# Patient Record
Sex: MALE | URBAN - METROPOLITAN AREA
[De-identification: names, ages, dates, MRNs, and addresses within clinical notes are randomized per-mention and may not be internally consistent; named-entity substitution may affect disease eponyms.]

---

## 2021-02-16 ENCOUNTER — NEW PATIENT (OUTPATIENT)
Dept: URBAN - METROPOLITAN AREA CLINIC 79 | Facility: CLINIC | Age: 56
End: 2021-02-16

## 2021-02-16 DIAGNOSIS — H26.102: ICD-10-CM

## 2021-02-16 DIAGNOSIS — H40.013: ICD-10-CM

## 2021-02-16 DIAGNOSIS — H35.3111: ICD-10-CM

## 2021-02-16 DIAGNOSIS — H52.203: ICD-10-CM

## 2021-02-16 PROCEDURE — 99204 OFFICE O/P NEW MOD 45 MIN: CPT

## 2021-02-16 PROCEDURE — 92133 CPTRZD OPH DX IMG PST SGM ON: CPT

## 2021-02-16 PROCEDURE — 92134 CPTRZ OPH DX IMG PST SGM RTA: CPT

## 2021-02-16 PROCEDURE — 92015 DETERMINE REFRACTIVE STATE: CPT

## 2021-02-16 ASSESSMENT — TONOMETRY
OD_IOP_MMHG: 22
OS_IOP_MMHG: 23

## 2021-02-16 ASSESSMENT — VISUAL ACUITY
OD_SC: 20/20
OS_SC: J10-1
OD_SC: J16
OD_CC: 20/20-1
OS_SC: 20/200
OS_CC: 20/30

## 2021-04-01 ENCOUNTER — CONSULT EP (OUTPATIENT)
Dept: URBAN - METROPOLITAN AREA CLINIC 79 | Facility: CLINIC | Age: 56
End: 2021-04-01

## 2021-04-01 VITALS — HEIGHT: 60 IN

## 2021-04-01 DIAGNOSIS — H40.023: ICD-10-CM

## 2021-04-01 PROCEDURE — 92250 FUNDUS PHOTOGRAPHY W/I&R: CPT

## 2021-04-01 PROCEDURE — 99214 OFFICE O/P EST MOD 30 MIN: CPT

## 2021-04-01 PROCEDURE — 92020 GONIOSCOPY: CPT

## 2021-04-01 PROCEDURE — 76514 ECHO EXAM OF EYE THICKNESS: CPT

## 2021-04-01 ASSESSMENT — PACHYMETRY
OS_CT_UM: 572
OD_CT_UM: 580

## 2021-04-01 ASSESSMENT — VISUAL ACUITY
OS_CC: 20/30
OD_CC: 20/20

## 2021-04-01 ASSESSMENT — TONOMETRY
OS_IOP_MMHG: 28
OD_IOP_MMHG: 24

## 2021-04-13 ENCOUNTER — CONSULT EP (OUTPATIENT)
Dept: URBAN - METROPOLITAN AREA CLINIC 79 | Facility: CLINIC | Age: 56
End: 2021-04-13

## 2021-04-13 DIAGNOSIS — H35.3132: ICD-10-CM

## 2021-04-13 PROCEDURE — 92134 CPTRZ OPH DX IMG PST SGM RTA: CPT

## 2021-04-13 PROCEDURE — 92014 COMPRE OPH EXAM EST PT 1/>: CPT

## 2021-04-13 ASSESSMENT — VISUAL ACUITY
OS_CC: 20/40+2
OU_CC: 20/25
OD_CC: 20/20-1

## 2021-04-13 ASSESSMENT — TONOMETRY
OS_IOP_MMHG: 23
OD_IOP_MMHG: 23

## 2021-04-15 ENCOUNTER — FOLLOW UP (OUTPATIENT)
Dept: URBAN - METROPOLITAN AREA CLINIC 79 | Facility: CLINIC | Age: 56
End: 2021-04-15

## 2021-04-15 DIAGNOSIS — H26.102: ICD-10-CM

## 2021-04-15 PROCEDURE — 92025 CPTRIZED CORNEAL TOPOGRAPHY: CPT

## 2021-04-15 PROCEDURE — 92136 OPHTHALMIC BIOMETRY: CPT

## 2021-04-15 PROCEDURE — 92012 INTRM OPH EXAM EST PATIENT: CPT

## 2021-04-15 ASSESSMENT — TONOMETRY
OD_IOP_MMHG: 25
OS_IOP_MMHG: 24

## 2021-04-15 ASSESSMENT — VISUAL ACUITY
OD_SC: 20/20-1
OS_SC: 20/100
OS_PH: 20/50

## 2021-04-21 ENCOUNTER — SURGERY/PROCEDURE (OUTPATIENT)
Dept: URBAN - METROPOLITAN AREA SURGICAL CENTER 17 | Facility: SURGICAL CENTER | Age: 56
End: 2021-04-21

## 2021-04-21 DIAGNOSIS — H26.102: ICD-10-CM

## 2021-04-21 PROCEDURE — 66984 XCAPSL CTRC RMVL W/O ECP: CPT

## 2021-04-22 ENCOUNTER — 1 DAY POST-OP (OUTPATIENT)
Dept: URBAN - METROPOLITAN AREA CLINIC 79 | Facility: CLINIC | Age: 56
End: 2021-04-22

## 2021-04-22 VITALS — HEIGHT: 60 IN

## 2021-04-22 DIAGNOSIS — Z96.1: ICD-10-CM

## 2021-04-22 PROCEDURE — 99024 POSTOP FOLLOW-UP VISIT: CPT

## 2021-04-22 ASSESSMENT — TONOMETRY
OS_IOP_MMHG: 37
OS_IOP_MMHG: 27

## 2021-04-22 ASSESSMENT — VISUAL ACUITY: OS_SC: 20/30-1

## 2021-05-04 ENCOUNTER — 1 WEEK POST-OP (OUTPATIENT)
Dept: URBAN - METROPOLITAN AREA CLINIC 79 | Facility: CLINIC | Age: 56
End: 2021-05-04

## 2021-05-04 VITALS — HEIGHT: 60 IN

## 2021-05-04 DIAGNOSIS — Z96.1: ICD-10-CM

## 2021-05-04 PROCEDURE — 99024 POSTOP FOLLOW-UP VISIT: CPT

## 2021-05-04 ASSESSMENT — VISUAL ACUITY
OS_SC: 20/25+2
OD_SC: 20/25+3
OD_SC: 20/60-2
OS_SC: 20/200

## 2021-05-04 ASSESSMENT — TONOMETRY
OD_IOP_MMHG: 22
OS_IOP_MMHG: 19

## 2021-05-18 ENCOUNTER — ESTABLISHED COMPREHENSIVE EXAM (OUTPATIENT)
Dept: URBAN - METROPOLITAN AREA CLINIC 79 | Facility: CLINIC | Age: 56
End: 2021-05-18

## 2021-05-18 VITALS — HEIGHT: 60 IN

## 2021-05-18 DIAGNOSIS — Z96.1: ICD-10-CM

## 2021-05-18 DIAGNOSIS — H52.11: ICD-10-CM

## 2021-05-18 PROCEDURE — 99024 POSTOP FOLLOW-UP VISIT: CPT

## 2021-05-18 PROCEDURE — 92015 DETERMINE REFRACTIVE STATE: CPT

## 2021-05-18 ASSESSMENT — VISUAL ACUITY
OU_SC: 20/20
OS_SC: J16
OS_SC: 20/20
OD_SC: J10
OD_SC: 20/20-1

## 2021-05-18 ASSESSMENT — TONOMETRY
OS_IOP_MMHG: 12
OD_IOP_MMHG: 14

## 2021-11-16 ENCOUNTER — FOLLOW UP (OUTPATIENT)
Dept: URBAN - METROPOLITAN AREA CLINIC 79 | Facility: CLINIC | Age: 56
End: 2021-11-16

## 2021-11-16 DIAGNOSIS — H40.013: ICD-10-CM

## 2021-11-16 DIAGNOSIS — H35.3131: ICD-10-CM

## 2021-11-16 DIAGNOSIS — Z96.1: ICD-10-CM

## 2021-11-16 PROCEDURE — 99214 OFFICE O/P EST MOD 30 MIN: CPT

## 2021-11-16 PROCEDURE — 92133 CPTRZD OPH DX IMG PST SGM ON: CPT

## 2021-11-16 PROCEDURE — 92134 CPTRZ OPH DX IMG PST SGM RTA: CPT | Mod: NC

## 2021-11-16 ASSESSMENT — VISUAL ACUITY
OS_SC: 20/20
OD_SC: 20/20

## 2021-11-16 ASSESSMENT — TONOMETRY
OS_IOP_MMHG: 15
OD_IOP_MMHG: 25

## 2021-11-18 ENCOUNTER — IOP CHECK (OUTPATIENT)
Dept: URBAN - METROPOLITAN AREA CLINIC 79 | Facility: CLINIC | Age: 56
End: 2021-11-18

## 2021-11-18 DIAGNOSIS — H40.023: ICD-10-CM

## 2021-11-18 PROCEDURE — 99213 OFFICE O/P EST LOW 20 MIN: CPT

## 2021-11-18 PROCEDURE — 92083 EXTENDED VISUAL FIELD XM: CPT

## 2021-11-18 ASSESSMENT — TONOMETRY
OD_IOP_MMHG: 20
OS_IOP_MMHG: 15
OS_IOP_MMHG: 13
OD_IOP_MMHG: 21

## 2021-11-18 ASSESSMENT — VISUAL ACUITY
OS_CC: 20/20
OD_CC: 20/25

## 2022-01-11 ENCOUNTER — FOLLOW UP (OUTPATIENT)
Dept: URBAN - METROPOLITAN AREA CLINIC 79 | Facility: CLINIC | Age: 57
End: 2022-01-11

## 2022-01-11 DIAGNOSIS — H35.3132: ICD-10-CM

## 2022-01-11 PROCEDURE — 99214 OFFICE O/P EST MOD 30 MIN: CPT

## 2022-01-11 PROCEDURE — 92134 CPTRZ OPH DX IMG PST SGM RTA: CPT

## 2022-01-11 ASSESSMENT — VISUAL ACUITY
OD_SC: 20/20
OS_SC: 20/20

## 2022-01-11 ASSESSMENT — TONOMETRY
OS_IOP_MMHG: 19
OD_IOP_MMHG: 20

## 2022-06-17 ENCOUNTER — ANESTHESIA (OUTPATIENT)
Dept: OPERATING ROOM | Facility: HOSPITAL | Age: 57
Setting detail: OBSERVATION
End: 2022-06-17
Payer: COMMERCIAL

## 2022-06-17 ENCOUNTER — HOSPITAL ENCOUNTER (OUTPATIENT)
Facility: HOSPITAL | Age: 57
Setting detail: OBSERVATION
Discharge: NURSING, REHAB OR LONG-TERM CARE | End: 2022-06-18
Attending: EMERGENCY MEDICINE | Admitting: SURGERY
Payer: COMMERCIAL

## 2022-06-17 ENCOUNTER — APPOINTMENT (EMERGENCY)
Dept: RADIOLOGY | Facility: HOSPITAL | Age: 57
End: 2022-06-17
Attending: EMERGENCY MEDICINE
Payer: COMMERCIAL

## 2022-06-17 DIAGNOSIS — K35.80 ACUTE APPENDICITIS, UNSPECIFIED ACUTE APPENDICITIS TYPE: Primary | ICD-10-CM

## 2022-06-17 LAB
ALBUMIN SERPL-MCNC: 4.4 G/DL (ref 3.4–5)
ALP SERPL-CCNC: 45 IU/L (ref 35–126)
ALT SERPL-CCNC: 22 IU/L (ref 16–63)
ANION GAP SERPL CALC-SCNC: 9 MEQ/L (ref 3–15)
AST SERPL-CCNC: 16 IU/L (ref 15–41)
BACTERIA URNS QL MICRO: ABNORMAL /HPF
BASOPHILS # BLD: 0.02 K/UL (ref 0.01–0.1)
BASOPHILS NFR BLD: 0.1 %
BILIRUB SERPL-MCNC: 1 MG/DL (ref 0.3–1.2)
BILIRUB UR QL STRIP.AUTO: NEGATIVE MG/DL
BUN SERPL-MCNC: 20 MG/DL (ref 8–20)
CALCIUM SERPL-MCNC: 9.5 MG/DL (ref 8.9–10.3)
CHLORIDE SERPL-SCNC: 101 MEQ/L (ref 98–109)
CLARITY UR REFRACT.AUTO: CLEAR
CO2 SERPL-SCNC: 24 MEQ/L (ref 22–32)
COLOR UR AUTO: YELLOW
CREAT SERPL-MCNC: 0.8 MG/DL (ref 0.8–1.3)
DIFFERENTIAL METHOD BLD: ABNORMAL
EOSINOPHIL # BLD: 0.01 K/UL (ref 0.04–0.54)
EOSINOPHIL NFR BLD: 0.1 %
ERYTHROCYTE [DISTWIDTH] IN BLOOD BY AUTOMATED COUNT: 13.2 % (ref 11.6–14.4)
ETHANOL SERPL-MCNC: <5 MG/DL
GFR SERPL CREATININE-BSD FRML MDRD: >60 ML/MIN/1.73M*2
GLUCOSE SERPL-MCNC: 146 MG/DL (ref 70–99)
GLUCOSE UR STRIP.AUTO-MCNC: NEGATIVE MG/DL
HCT VFR BLDCO AUTO: 44.3 % (ref 40.1–51)
HGB BLD-MCNC: 15.6 G/DL (ref 13.7–17.5)
HGB UR QL STRIP.AUTO: NEGATIVE
HYALINE CASTS #/AREA URNS LPF: ABNORMAL /LPF
IMM GRANULOCYTES # BLD AUTO: 0.09 K/UL (ref 0–0.08)
IMM GRANULOCYTES NFR BLD AUTO: 0.6 %
KETONES UR STRIP.AUTO-MCNC: NEGATIVE MG/DL
LEUKOCYTE ESTERASE UR QL STRIP.AUTO: NEGATIVE
LIPASE SERPL-CCNC: 31 U/L (ref 20–51)
LYMPHOCYTES # BLD: 0.68 K/UL (ref 1.2–3.5)
LYMPHOCYTES NFR BLD: 4.8 %
MCH RBC QN AUTO: 31.8 PG (ref 28–33.2)
MCHC RBC AUTO-ENTMCNC: 35.2 G/DL (ref 32.2–36.5)
MCV RBC AUTO: 90.4 FL (ref 83–98)
MONOCYTES # BLD: 1.18 K/UL (ref 0.3–1)
MONOCYTES NFR BLD: 8.3 %
MUCOUS THREADS URNS QL MICRO: 1 /LPF
NEUTROPHILS # BLD: 12.3 K/UL (ref 1.7–7)
NEUTS SEG NFR BLD: 86.1 %
NITRITE UR QL STRIP.AUTO: NEGATIVE
NRBC BLD-RTO: 0 %
PDW BLD AUTO: 11.9 FL (ref 9.4–12.4)
PH UR STRIP.AUTO: 7 [PH]
PLATELET # BLD AUTO: 194 K/UL (ref 150–350)
POTASSIUM SERPL-SCNC: 3.7 MEQ/L (ref 3.6–5.1)
PROT SERPL-MCNC: 7.6 G/DL (ref 6–8.2)
PROT UR QL STRIP.AUTO: 2
RBC # BLD AUTO: 4.9 M/UL (ref 4.5–5.8)
RBC #/AREA URNS HPF: ABNORMAL /HPF
SARS-COV-2 RNA RESP QL NAA+PROBE: NEGATIVE
SODIUM SERPL-SCNC: 134 MEQ/L (ref 136–144)
SP GR UR REFRACT.AUTO: >1.035
SQUAMOUS URNS QL MICRO: ABNORMAL /HPF
TROPONIN I SERPL HS-MCNC: 3.1 PG/ML
UROBILINOGEN UR STRIP-ACNC: 0.2 EU/DL
WBC # BLD AUTO: 14.28 K/UL (ref 3.8–10.5)
WBC #/AREA URNS HPF: ABNORMAL /HPF

## 2022-06-17 PROCEDURE — 36000014 HC OR LEVEL 4 EA ADDL MIN: Performed by: SURGERY

## 2022-06-17 PROCEDURE — 0DTJ4ZZ RESECTION OF APPENDIX, PERCUTANEOUS ENDOSCOPIC APPROACH: ICD-10-PCS | Performed by: SURGERY

## 2022-06-17 PROCEDURE — 80053 COMPREHEN METABOLIC PANEL: CPT | Performed by: NURSE PRACTITIONER

## 2022-06-17 PROCEDURE — 63600000 HC DRUGS/DETAIL CODE: Performed by: NURSE PRACTITIONER

## 2022-06-17 PROCEDURE — 96375 TX/PRO/DX INJ NEW DRUG ADDON: CPT | Mod: 59

## 2022-06-17 PROCEDURE — 25000000 HC PHARMACY GENERAL: Performed by: NURSE PRACTITIONER

## 2022-06-17 PROCEDURE — 63600000 HC DRUGS/DETAIL CODE: Performed by: NURSE ANESTHETIST, CERTIFIED REGISTERED

## 2022-06-17 PROCEDURE — 25000000 HC PHARMACY GENERAL: Performed by: SURGERY

## 2022-06-17 PROCEDURE — 83690 ASSAY OF LIPASE: CPT | Performed by: NURSE PRACTITIONER

## 2022-06-17 PROCEDURE — G0480 DRUG TEST DEF 1-7 CLASSES: HCPCS | Performed by: NURSE PRACTITIONER

## 2022-06-17 PROCEDURE — 36415 COLL VENOUS BLD VENIPUNCTURE: CPT | Performed by: NURSE PRACTITIONER

## 2022-06-17 PROCEDURE — 71000001 HC PACU PHASE 1 INITIAL 30MIN: Performed by: SURGERY

## 2022-06-17 PROCEDURE — 96366 THER/PROPH/DIAG IV INF ADDON: CPT | Mod: 59

## 2022-06-17 PROCEDURE — 25000000 HC PHARMACY GENERAL: Performed by: NURSE ANESTHETIST, CERTIFIED REGISTERED

## 2022-06-17 PROCEDURE — 25800000 HC PHARMACY IV SOLUTIONS: Performed by: SURGERY

## 2022-06-17 PROCEDURE — 37000001 HC ANESTHESIA GENERAL: Performed by: SURGERY

## 2022-06-17 PROCEDURE — 63600105 HC IODINE BASED CONTRAST: Mod: JW | Performed by: NURSE PRACTITIONER

## 2022-06-17 PROCEDURE — 96365 THER/PROPH/DIAG IV INF INIT: CPT | Mod: 59

## 2022-06-17 PROCEDURE — 71000011 HC PACU PHASE 1 EA ADDL MIN: Performed by: SURGERY

## 2022-06-17 PROCEDURE — 93005 ELECTROCARDIOGRAM TRACING: CPT | Performed by: NURSE PRACTITIONER

## 2022-06-17 PROCEDURE — 27200000 HC STERILE SUPPLY: Performed by: SURGERY

## 2022-06-17 PROCEDURE — 96361 HYDRATE IV INFUSION ADD-ON: CPT | Mod: 59

## 2022-06-17 PROCEDURE — 25800000 HC PHARMACY IV SOLUTIONS: Performed by: NURSE PRACTITIONER

## 2022-06-17 PROCEDURE — G0378 HOSPITAL OBSERVATION PER HR: HCPCS

## 2022-06-17 PROCEDURE — 36000004 HC OR LEVEL 4 INITIAL 30MIN: Performed by: SURGERY

## 2022-06-17 PROCEDURE — 88304 TISSUE EXAM BY PATHOLOGIST: CPT | Performed by: SURGERY

## 2022-06-17 PROCEDURE — 96367 TX/PROPH/DG ADDL SEQ IV INF: CPT | Mod: 59

## 2022-06-17 PROCEDURE — 85025 COMPLETE CBC W/AUTO DIFF WBC: CPT | Performed by: NURSE PRACTITIONER

## 2022-06-17 PROCEDURE — 81001 URINALYSIS AUTO W/SCOPE: CPT | Mod: 59 | Performed by: NURSE PRACTITIONER

## 2022-06-17 PROCEDURE — 63700000 HC SELF-ADMINISTRABLE DRUG: Performed by: SURGERY

## 2022-06-17 PROCEDURE — G1004 CDSM NDSC: HCPCS

## 2022-06-17 PROCEDURE — U0003 INFECTIOUS AGENT DETECTION BY NUCLEIC ACID (DNA OR RNA); SEVERE ACUTE RESPIRATORY SYNDROME CORONAVIRUS 2 (SARS-COV-2) (CORONAVIRUS DISEASE [COVID-19]), AMPLIFIED PROBE TECHNIQUE, MAKING USE OF HIGH THROUGHPUT TECHNOLOGIES AS DESCRIBED BY CMS-2020-01-R: HCPCS | Performed by: NURSE PRACTITIONER

## 2022-06-17 PROCEDURE — 84484 ASSAY OF TROPONIN QUANT: CPT | Performed by: NURSE PRACTITIONER

## 2022-06-17 PROCEDURE — 63600000 HC DRUGS/DETAIL CODE: Performed by: SURGERY

## 2022-06-17 PROCEDURE — 99285 EMERGENCY DEPT VISIT HI MDM: CPT | Mod: 25

## 2022-06-17 RX ORDER — METOPROLOL SUCCINATE 25 MG/1
50 TABLET, EXTENDED RELEASE ORAL DAILY
Status: DISCONTINUED | OUTPATIENT
Start: 2022-06-17 | End: 2022-06-17

## 2022-06-17 RX ORDER — SERTRALINE HYDROCHLORIDE 25 MG/1
25 TABLET, FILM COATED ORAL DAILY
COMMUNITY

## 2022-06-17 RX ORDER — IBUPROFEN/PSEUDOEPHEDRINE HCL 200MG-30MG
3 TABLET ORAL NIGHTLY
Status: DISCONTINUED | OUTPATIENT
Start: 2022-06-17 | End: 2022-06-18 | Stop reason: HOSPADM

## 2022-06-17 RX ORDER — HYDROMORPHONE HYDROCHLORIDE 1 MG/ML
0.5 INJECTION, SOLUTION INTRAMUSCULAR; INTRAVENOUS; SUBCUTANEOUS
Status: DISCONTINUED | OUTPATIENT
Start: 2022-06-17 | End: 2022-06-17

## 2022-06-17 RX ORDER — HYDROMORPHONE HYDROCHLORIDE 1 MG/ML
0.5 INJECTION, SOLUTION INTRAMUSCULAR; INTRAVENOUS; SUBCUTANEOUS
Status: DISCONTINUED | OUTPATIENT
Start: 2022-06-17 | End: 2022-06-18 | Stop reason: HOSPADM

## 2022-06-17 RX ORDER — EPHEDRINE SULFATE/0.9% NACL/PF 50 MG/5 ML
10 SYRINGE (ML) INTRAVENOUS AS NEEDED
Status: DISCONTINUED | OUTPATIENT
Start: 2022-06-17 | End: 2022-06-17 | Stop reason: HOSPADM

## 2022-06-17 RX ORDER — HYDROMORPHONE HYDROCHLORIDE 1 MG/ML
INJECTION, SOLUTION INTRAMUSCULAR; INTRAVENOUS; SUBCUTANEOUS AS NEEDED
Status: DISCONTINUED | OUTPATIENT
Start: 2022-06-17 | End: 2022-06-17 | Stop reason: SURG

## 2022-06-17 RX ORDER — LABETALOL HCL 20 MG/4 ML
5 SYRINGE (ML) INTRAVENOUS AS NEEDED
Status: DISCONTINUED | OUTPATIENT
Start: 2022-06-17 | End: 2022-06-17 | Stop reason: HOSPADM

## 2022-06-17 RX ORDER — OXAZEPAM 10 MG/1
10 CAPSULE ORAL NIGHTLY PRN
Status: DISCONTINUED | OUTPATIENT
Start: 2022-06-17 | End: 2022-06-18 | Stop reason: HOSPADM

## 2022-06-17 RX ORDER — LIDOCAINE HYDROCHLORIDE 10 MG/ML
INJECTION, SOLUTION INFILTRATION; PERINEURAL AS NEEDED
Status: DISCONTINUED | OUTPATIENT
Start: 2022-06-17 | End: 2022-06-17 | Stop reason: SURG

## 2022-06-17 RX ORDER — FOLIC ACID 1 MG/1
1 TABLET ORAL DAILY
COMMUNITY

## 2022-06-17 RX ORDER — METOPROLOL SUCCINATE 50 MG/1
50 TABLET, EXTENDED RELEASE ORAL DAILY
Status: ON HOLD | COMMUNITY
End: 2022-06-17 | Stop reason: ENTERED-IN-ERROR

## 2022-06-17 RX ORDER — FOLIC ACID 1 MG/1
1 TABLET ORAL DAILY
Status: DISCONTINUED | OUTPATIENT
Start: 2022-06-18 | End: 2022-06-18 | Stop reason: HOSPADM

## 2022-06-17 RX ORDER — PHENYLEPHRINE HYDROCHLORIDE 10 MG/ML
INJECTION INTRAVENOUS AS NEEDED
Status: DISCONTINUED | OUTPATIENT
Start: 2022-06-17 | End: 2022-06-17 | Stop reason: SURG

## 2022-06-17 RX ORDER — TRAZODONE HYDROCHLORIDE 50 MG/1
50-100 TABLET ORAL NIGHTLY PRN
COMMUNITY

## 2022-06-17 RX ORDER — THIAMINE HCL 100 MG
100 TABLET ORAL DAILY
Status: DISCONTINUED | OUTPATIENT
Start: 2022-06-17 | End: 2022-06-18 | Stop reason: HOSPADM

## 2022-06-17 RX ORDER — ONDANSETRON HYDROCHLORIDE 2 MG/ML
4 INJECTION, SOLUTION INTRAVENOUS
Status: DISCONTINUED | OUTPATIENT
Start: 2022-06-17 | End: 2022-06-17 | Stop reason: HOSPADM

## 2022-06-17 RX ORDER — HYDROMORPHONE HYDROCHLORIDE 1 MG/ML
0.5 INJECTION, SOLUTION INTRAMUSCULAR; INTRAVENOUS; SUBCUTANEOUS
Status: DISCONTINUED | OUTPATIENT
Start: 2022-06-17 | End: 2022-06-17 | Stop reason: HOSPADM

## 2022-06-17 RX ORDER — SODIUM CHLORIDE 9 MG/ML
INJECTION, SOLUTION INTRAVENOUS CONTINUOUS
Status: DISCONTINUED | OUTPATIENT
Start: 2022-06-17 | End: 2022-06-17

## 2022-06-17 RX ORDER — LORATADINE 10 MG/1
10 TABLET ORAL DAILY PRN
COMMUNITY

## 2022-06-17 RX ORDER — FOLIC ACID 1 MG/1
1 TABLET ORAL DAILY
Status: ON HOLD | COMMUNITY
End: 2022-06-17 | Stop reason: ENTERED-IN-ERROR

## 2022-06-17 RX ORDER — METRONIDAZOLE 500 MG/100ML
500 INJECTION, SOLUTION INTRAVENOUS ONCE
Status: COMPLETED | OUTPATIENT
Start: 2022-06-17 | End: 2022-06-17

## 2022-06-17 RX ORDER — SERTRALINE HYDROCHLORIDE 50 MG/1
50 TABLET, FILM COATED ORAL DAILY
COMMUNITY
Start: 2022-05-05

## 2022-06-17 RX ORDER — TALC
3 POWDER (GRAM) TOPICAL NIGHTLY
Status: ON HOLD | COMMUNITY
End: 2022-06-17 | Stop reason: ENTERED-IN-ERROR

## 2022-06-17 RX ORDER — METOPROLOL TARTRATE 50 MG/1
50 TABLET ORAL EVERY 6 HOURS PRN
COMMUNITY

## 2022-06-17 RX ORDER — ACETAMINOPHEN 325 MG/1
650 TABLET ORAL EVERY 4 HOURS PRN
Status: DISCONTINUED | OUTPATIENT
Start: 2022-06-17 | End: 2022-06-17

## 2022-06-17 RX ORDER — MEPERIDINE HYDROCHLORIDE 25 MG/ML
12.5 INJECTION INTRAMUSCULAR; INTRAVENOUS; SUBCUTANEOUS EVERY 10 MIN PRN
Status: DISCONTINUED | OUTPATIENT
Start: 2022-06-17 | End: 2022-06-17 | Stop reason: HOSPADM

## 2022-06-17 RX ORDER — LEVETIRACETAM 500 MG/1
500 TABLET ORAL 2 TIMES DAILY
Status: DISCONTINUED | OUTPATIENT
Start: 2022-06-17 | End: 2022-06-18 | Stop reason: HOSPADM

## 2022-06-17 RX ORDER — OXYCODONE HYDROCHLORIDE 5 MG/1
5 TABLET ORAL EVERY 4 HOURS PRN
Status: DISCONTINUED | OUTPATIENT
Start: 2022-06-17 | End: 2022-06-18 | Stop reason: HOSPADM

## 2022-06-17 RX ORDER — MIDAZOLAM HYDROCHLORIDE 2 MG/2ML
INJECTION, SOLUTION INTRAMUSCULAR; INTRAVENOUS AS NEEDED
Status: DISCONTINUED | OUTPATIENT
Start: 2022-06-17 | End: 2022-06-17 | Stop reason: SURG

## 2022-06-17 RX ORDER — SERTRALINE HYDROCHLORIDE 50 MG/1
50 TABLET, FILM COATED ORAL DAILY
Status: DISCONTINUED | OUTPATIENT
Start: 2022-06-17 | End: 2022-06-17

## 2022-06-17 RX ORDER — IODIXANOL 320 MG/ML
75 INJECTION, SOLUTION INTRAVASCULAR ONCE
Status: COMPLETED | OUTPATIENT
Start: 2022-06-17 | End: 2022-06-17

## 2022-06-17 RX ORDER — ROCURONIUM BROMIDE 10 MG/ML
INJECTION, SOLUTION INTRAVENOUS AS NEEDED
Status: DISCONTINUED | OUTPATIENT
Start: 2022-06-17 | End: 2022-06-17 | Stop reason: SURG

## 2022-06-17 RX ORDER — FENTANYL CITRATE 50 UG/ML
INJECTION, SOLUTION INTRAMUSCULAR; INTRAVENOUS AS NEEDED
Status: DISCONTINUED | OUTPATIENT
Start: 2022-06-17 | End: 2022-06-17 | Stop reason: SURG

## 2022-06-17 RX ORDER — DEXTROSE 40 %
15-30 GEL (GRAM) ORAL AS NEEDED
Status: DISCONTINUED | OUTPATIENT
Start: 2022-06-17 | End: 2022-06-18 | Stop reason: HOSPADM

## 2022-06-17 RX ORDER — FENTANYL CITRATE 50 UG/ML
50 INJECTION, SOLUTION INTRAMUSCULAR; INTRAVENOUS
Status: DISCONTINUED | OUTPATIENT
Start: 2022-06-17 | End: 2022-06-17 | Stop reason: HOSPADM

## 2022-06-17 RX ORDER — TALC
3-6 POWDER (GRAM) TOPICAL NIGHTLY
COMMUNITY

## 2022-06-17 RX ORDER — KETOROLAC TROMETHAMINE 30 MG/ML
INJECTION, SOLUTION INTRAMUSCULAR; INTRAVENOUS AS NEEDED
Status: DISCONTINUED | OUTPATIENT
Start: 2022-06-17 | End: 2022-06-17 | Stop reason: SURG

## 2022-06-17 RX ORDER — LISINOPRIL 5 MG/1
5 TABLET ORAL DAILY
Status: DISCONTINUED | OUTPATIENT
Start: 2022-06-18 | End: 2022-06-18 | Stop reason: HOSPADM

## 2022-06-17 RX ORDER — PROPOFOL 10 MG/ML
INJECTION, EMULSION INTRAVENOUS AS NEEDED
Status: DISCONTINUED | OUTPATIENT
Start: 2022-06-17 | End: 2022-06-17 | Stop reason: SURG

## 2022-06-17 RX ORDER — DEXTROSE 50 % IN WATER (D50W) INTRAVENOUS SYRINGE
25 AS NEEDED
Status: DISCONTINUED | OUTPATIENT
Start: 2022-06-17 | End: 2022-06-18 | Stop reason: HOSPADM

## 2022-06-17 RX ORDER — CETIRIZINE HYDROCHLORIDE 10 MG/1
10 TABLET ORAL NIGHTLY
Status: DISCONTINUED | OUTPATIENT
Start: 2022-06-17 | End: 2022-06-18 | Stop reason: HOSPADM

## 2022-06-17 RX ORDER — LANOLIN ALCOHOL/MO/W.PET/CERES
100 CREAM (GRAM) TOPICAL DAILY
COMMUNITY

## 2022-06-17 RX ORDER — LEVETIRACETAM 500 MG/1
500 TABLET ORAL 2 TIMES DAILY
COMMUNITY

## 2022-06-17 RX ORDER — ONDANSETRON HYDROCHLORIDE 2 MG/ML
INJECTION, SOLUTION INTRAVENOUS AS NEEDED
Status: DISCONTINUED | OUTPATIENT
Start: 2022-06-17 | End: 2022-06-17 | Stop reason: SURG

## 2022-06-17 RX ORDER — BUPIVACAINE HCL/EPINEPHRINE 0.5-1:200K
VIAL (ML) INJECTION
Status: DISCONTINUED | OUTPATIENT
Start: 2022-06-17 | End: 2022-06-17 | Stop reason: HOSPADM

## 2022-06-17 RX ORDER — IBUPROFEN 200 MG
16-32 TABLET ORAL AS NEEDED
Status: DISCONTINUED | OUTPATIENT
Start: 2022-06-17 | End: 2022-06-18 | Stop reason: HOSPADM

## 2022-06-17 RX ORDER — LISINOPRIL 5 MG/1
5 TABLET ORAL DAILY
COMMUNITY
Start: 2022-04-06

## 2022-06-17 RX ORDER — METOPROLOL TARTRATE 50 MG/1
50 TABLET ORAL EVERY 6 HOURS PRN
Status: DISCONTINUED | OUTPATIENT
Start: 2022-06-17 | End: 2022-06-18 | Stop reason: HOSPADM

## 2022-06-17 RX ORDER — KETOROLAC TROMETHAMINE 15 MG/ML
15 INJECTION, SOLUTION INTRAMUSCULAR; INTRAVENOUS EVERY 6 HOURS PRN
Status: DISCONTINUED | OUTPATIENT
Start: 2022-06-18 | End: 2022-06-18 | Stop reason: HOSPADM

## 2022-06-17 RX ORDER — ONDANSETRON HYDROCHLORIDE 2 MG/ML
4 INJECTION, SOLUTION INTRAVENOUS EVERY 8 HOURS PRN
Status: DISCONTINUED | OUTPATIENT
Start: 2022-06-17 | End: 2022-06-18 | Stop reason: HOSPADM

## 2022-06-17 RX ORDER — METRONIDAZOLE 500 MG/100ML
500 INJECTION, SOLUTION INTRAVENOUS
Status: DISCONTINUED | OUTPATIENT
Start: 2022-06-17 | End: 2022-06-18 | Stop reason: HOSPADM

## 2022-06-17 RX ADMIN — THIAMINE HCL TAB 100 MG 100 MG: 100 TAB at 21:11

## 2022-06-17 RX ADMIN — OXYCODONE HYDROCHLORIDE 5 MG: 5 TABLET ORAL at 21:11

## 2022-06-17 RX ADMIN — FENTANYL CITRATE 50 MCG: 50 INJECTION INTRAMUSCULAR; INTRAVENOUS at 17:35

## 2022-06-17 RX ADMIN — SUCCINYLCHOLINE CHLORIDE 120 MG: 20 INJECTION, SOLUTION INTRAMUSCULAR; INTRAVENOUS at 17:41

## 2022-06-17 RX ADMIN — ONDANSETRON 4 MG: 2 INJECTION INTRAMUSCULAR; INTRAVENOUS at 18:12

## 2022-06-17 RX ADMIN — LIDOCAINE HYDROCHLORIDE 5 ML: 10 INJECTION, SOLUTION INFILTRATION; PERINEURAL at 17:41

## 2022-06-17 RX ADMIN — FENTANYL CITRATE 50 MCG: 50 INJECTION INTRAMUSCULAR; INTRAVENOUS at 17:41

## 2022-06-17 RX ADMIN — SODIUM CHLORIDE 1 G: 900 INJECTION INTRAVENOUS at 12:30

## 2022-06-17 RX ADMIN — SODIUM CHLORIDE 1000 ML: 9 INJECTION, SOLUTION INTRAVENOUS at 10:16

## 2022-06-17 RX ADMIN — ROCURONIUM BROMIDE 40 MG: 10 INJECTION INTRAVENOUS at 17:48

## 2022-06-17 RX ADMIN — HYDROMORPHONE HYDROCHLORIDE 0.5 MG: 1 INJECTION, SOLUTION INTRAMUSCULAR; INTRAVENOUS; SUBCUTANEOUS at 18:14

## 2022-06-17 RX ADMIN — PHENYLEPHRINE HYDROCHLORIDE 200 MCG: 10 INJECTION INTRAVENOUS at 17:49

## 2022-06-17 RX ADMIN — METRONIDAZOLE 500 MG: 500 INJECTION, SOLUTION INTRAVENOUS at 13:12

## 2022-06-17 RX ADMIN — HYDROMORPHONE HYDROCHLORIDE 0.5 MG: 1 INJECTION, SOLUTION INTRAMUSCULAR; INTRAVENOUS; SUBCUTANEOUS at 13:41

## 2022-06-17 RX ADMIN — PROPOFOL 200 MG: 10 INJECTION, EMULSION INTRAVENOUS at 17:41

## 2022-06-17 RX ADMIN — ACETAMINOPHEN 650 MG: 325 TABLET ORAL at 13:41

## 2022-06-17 RX ADMIN — LEVETIRACETAM 500 MG: 500 TABLET, FILM COATED ORAL at 21:11

## 2022-06-17 RX ADMIN — Medication 3 MG: at 21:11

## 2022-06-17 RX ADMIN — MIDAZOLAM HYDROCHLORIDE 2 MG: 1 INJECTION, SOLUTION INTRAMUSCULAR; INTRAVENOUS at 17:29

## 2022-06-17 RX ADMIN — SODIUM CHLORIDE: 9 INJECTION, SOLUTION INTRAVENOUS at 16:48

## 2022-06-17 RX ADMIN — SUGAMMADEX 200 MG: 100 INJECTION, SOLUTION INTRAVENOUS at 18:40

## 2022-06-17 RX ADMIN — IODIXANOL 75 ML: 320 INJECTION, SOLUTION INTRAVASCULAR at 11:11

## 2022-06-17 RX ADMIN — KETOROLAC TROMETHAMINE 30 MG: 30 INJECTION, SOLUTION INTRAMUSCULAR; INTRAVENOUS at 18:34

## 2022-06-17 RX ADMIN — METRONIDAZOLE 500 MG: 500 INJECTION, SOLUTION INTRAVENOUS at 21:12

## 2022-06-17 RX ADMIN — PHENYLEPHRINE HYDROCHLORIDE 100 MCG: 10 INJECTION INTRAVENOUS at 17:56

## 2022-06-17 RX ADMIN — ROCURONIUM BROMIDE 10 MG: 10 INJECTION INTRAVENOUS at 17:41

## 2022-06-17 RX ADMIN — PHENYLEPHRINE HYDROCHLORIDE 100 MCG: 10 INJECTION INTRAVENOUS at 17:46

## 2022-06-17 RX ADMIN — SODIUM CHLORIDE 1000 ML: 9 INJECTION, SOLUTION INTRAVENOUS at 12:30

## 2022-06-17 ASSESSMENT — PATIENT HEALTH QUESTIONNAIRE - PHQ9: SUM OF ALL RESPONSES TO PHQ9 QUESTIONS 1 & 2: 0

## 2022-06-17 NOTE — OR SURGEON
Pre-Procedure patient identification:  I am the primary operating surgeon/proceduralist and I have identified the patient on 06/17/22 at 5:15 PM Daljit Marlow MD  Phone Number: [phone]

## 2022-06-17 NOTE — ED ATTESTATION NOTE
Procedures  Physical Exam  Review of Systems    6/17/202210:03 AM  I have personally seen and examined the patient.  I reviewed and agree with the PA/NP/Resident's assessment and plan of care.    My examination, assessment, and plan of care of Omer Steele is as follows:  The patient presents with lower abdominal pain.  Patient was seen as an outpatient and sent in to rule out appendicitis.  Exam: Right lower quadrant abdominal pain.  No guarding, rigidity or rebound  Impression/Plan: IV, labs, CT, observe    Vital Signs Review: Vital signs have been reviewed. The oxygen saturation is 99% which is normal.    I was physically present for the key/critical portions of the following procedures: None    This document was created using dragon dictation software.  There might be some typographical errors due to this technology.    We are in a pandemic with increased volumes and decreased capacity.      Harry Dinero MD  06/17/22 6962

## 2022-06-17 NOTE — ANESTHESIA PREPROCEDURE EVALUATION
Relevant Problems   Digestive   (+) Acute appendicitis      Other   (+) BMI 32.0-32.9,adult     Patient Active Problem List   Diagnosis   • Acute appendicitis   • BMI 32.0-32.9,adult     Past Medical History:   Diagnosis Date   • Alcoholism (CMS/[facility])    • Anxiety    • BMI 32.0-32.9,adult 6/17/2022   • Depression    • High blood pressure        ROS/Med Hx     Past Surgical History:   Procedure Laterality Date   • CATARACT EXTRACTION Left      Social History     Socioeconomic History   • Marital status: Single     Spouse name: Not on file   • Number of children: Not on file   • Years of education: Not on file   • Highest education level: Not on file   Occupational History   • Not on file   Tobacco Use   • Smoking status: Current Every Day Smoker     Packs/day: 1.00   • Smokeless tobacco: Never Used   Substance and Sexual Activity   • Alcohol use: Not Currently     Comment: recoving alcoholic and is currently at [facility]   • Drug use: Not Currently   • Sexual activity: Not on file   Other Topics Concern   • Not on file   Social History Narrative   • Not on file     Social Determinants of Health     Financial Resource Strain: Not on file   Food Insecurity: No Food Insecurity   • Worried About Running Out of Food in the Last Year: Never true   • Ran Out of Food in the Last Year: Never true   Transportation Needs: Not on file   Physical Activity: Not on file   Stress: Not on file   Social Connections: Not on file   Intimate Partner Violence: Not on file   Housing Stability: Not on file     Patient Active Problem List   Diagnosis   • Acute appendicitis   • BMI 32.0-32.9,adult       Current Facility-Administered Medications   Medication Dose Route Frequency   • [MAR Hold] acetaminophen  650 mg oral q4h PRN   • [MAR Hold] HYDROmorphone  0.5 mg intravenous q3h PRN   • sodium chloride 0.9 %   intravenous Continuous       Prior to Admission medications    Medication Sig Start Date End Date Taking? Authorizing Provider   folic acid  (FOLVITE) 1 mg tablet Take 1 mg by mouth daily.   Yes Janie Brandt MD   levETIRAcetam (KEPPRA) 500 mg tablet Take 500 mg by mouth 2 (two) times a day.   Yes Janie Brandt MD   loratadine (CLARITIN) 10 mg tablet Take 10 mg by mouth daily.   Yes Janie Brandt MD   melatonin 3 mg tablet Take 3 mg by mouth nightly.   Yes Janie Brandt MD   metoprolol succinate XL (TOPROL-XL) 50 mg 24 hr tablet Take 50 mg by mouth daily.   Yes Janie Brandt MD   multivitamin tablet Take by mouth daily.   Yes Janie Brandt MD   oxazepam (SERAX) 10 mg capsule Take 10 mg by mouth nightly as needed for sleep.   Yes Janie Brandt MD   thiamine 100 mg tablet Take 100 mg by mouth daily.   Yes Janie Brandt MD   lisinopriL (PRINIVIL) 5 mg tablet  4/6/22   Janie Brandt MD   sertraline (ZOLOFT) 50 mg tablet  5/5/22   Janie Brandt MD       CBC Results       06/17/22     1015    WBC 14.28    RBC 4.90    HGB 15.6    HCT 44.3    MCV 90.4    MCH 31.8    MCHC 35.2              BMP Results       06/17/22     1015        K 3.7    Cl 101    CO2 24    Glucose 146    BUN 20    Creatinine 0.8    Calcium 9.5    Anion Gap 9    EGFR >60.0         Comment for K at 1015 on 06/17/22: Results obtained on plasma. Plasma Potassium values may be up to 0.4 mEQ/L less than serum values. The differences may be greater for patients with high platelet or white cell counts.          No results found for: HCGPREGUR, PREGSERUM, HCG, HCGQUANT          CT ABDOMEN PELVIS WITH IV CONTRAST   Final Result   IMPRESSION: Acute appendicitis.                  ECG 12 lead   ED Interpretation   Rhythm: Normal Sinus  Rate: 96  P waves: [normal interval]  QRS: [normal QRS]  Axis: [normal]  ST Segments: Nonspecific    Rhythm Strip: Normal sinus rhythm with a rate of 96    O2 sat: [normal 94%                06/17/22 1313  SARS-CoV-2 (COVID-19), PCR Nasopharynx   Collected: 06/17/22 1222  Final  result  Specimen: Nasopharyngeal Swab from Nasopharynx    SARS-CoV-2 (COVID-19) Negative              Physical Exam    Airway   Mallampati: III   TM distance: >3 FB   Neck ROM: full  Dental    Teeth Problems: chipped        Anesthesia Plan    Plan: general    Technique: general endotracheal   ASA 3 - emergent

## 2022-06-17 NOTE — ANESTHESIA POSTPROCEDURE EVALUATION
Patient: Omer Steele    Procedure Summary     Date: 06/17/22 Room / Location:  OR 3 / PH OR    Anesthesia Start: 1733 Anesthesia Stop: 1856    Procedure: APPENDECTOMY LAPAROSCOPIC AND UMBILICAL HERNIA REPAIR (N/A Abdomen) Diagnosis:       Acute appendicitis, unspecified acute appendicitis type      (Acute appendicitis, unspecified acute appendicitis type [K35.80])    Surgeons: Daljit Marlow MD Responsible Provider: Sae Mallory MD    Anesthesia Type: general ASA Status: 3 - Emergent          Anesthesia Type: general  PACU Vitals  6/17/2022 1848 - 6/17/2022 1948      6/17/2022  1850 6/17/2022  1900 6/17/2022  1915 6/17/2022  1930    BP: 139/75 133/76 117/70 114/68    Temp: 37.3 °C (99.2 °F) -- -- --    Pulse: 78 74 68 70    Resp: 20 18 16 14    SpO2: 94 % 95 % 96 % 96 %              6/17/2022 1945             BP: 106/63       Temp: --       Pulse: 70       Resp: 14       SpO2: 96 %               Anesthesia Post Evaluation    Pain management: adequate  Patient participation: complete - patient participated  Level of consciousness: awake and alert  Cardiovascular status: acceptable  Airway Patency: adequate  Respiratory status: acceptable  Hydration status: acceptable  Anesthetic complications: no

## 2022-06-17 NOTE — ED PROVIDER NOTES
Chief Complaint   Patient presents with   • Abdominal Pain        HPI   56-year-old male with known history of alcoholism anxiety depression hypertension currently in [facility] for ETOH detox  reports to emergency room today related to vomiting times once and lower abdominal pain  Chest pain 10 out of 10 initially  Now 6/10   No fever or diarrhea      Past Medical History:   Diagnosis Date   • Alcoholism (CMS/HCC)    • Anxiety    • BMI 32.0-32.9,adult 6/17/2022   • Depression    • High blood pressure          Past Surgical History:   Procedure Laterality Date   • CATARACT EXTRACTION Left        History reviewed. No pertinent family history.    Social History     Tobacco Use   • Smoking status: Current Every Day Smoker     Packs/day: 1.00   • Smokeless tobacco: Never Used   Substance Use Topics   • Alcohol use: Not Currently     Comment: recoving alcoholic and is currently at [facility]   • Drug use: Not Currently       Systems Reviewed from Nursing Triage:   Tobacco  Allergies  Meds  Problems  Med Hx  Surg Hx  Fam Hx  Soc   Hx               Review of Systems   Constitutional: Negative for fever.   HENT: Negative for congestion.    Respiratory: Negative for chest tightness and shortness of breath.    Gastrointestinal: Positive for abdominal pain.   Allergic/Immunologic: Negative for immunocompromised state.   Neurological: Negative for headaches.   Psychiatric/Behavioral: Negative for confusion.            ED Triage Vitals   Temp Heart Rate Resp BP SpO2   06/17/22 1013 06/17/22 1013 06/17/22 1013 06/17/22 1013 06/17/22 1013   36.7 °C (98 °F) 100 18 130/83 94 %      Temp Source Heart Rate Source Patient Position BP Location FiO2 (%) (Set)   06/17/22 1013 06/17/22 1706 06/17/22 1013 06/17/22 1013 --   Temporal Left Radial Lying Left upper arm        Vitals:    06/17/22 2340 06/18/22 0400 06/18/22 0800 06/18/22 1200   BP: (!) 98/53 118/64 118/70 130/71   BP Location: Right upper arm Right upper arm Right upper arm Right upper  arm   Patient Position: Lying Lying Lying Lying   Pulse: 67 71 75 78   Resp: 16 16 16 17   Temp: 37.1 °C (98.7 °F) 37.2 °C (99 °F) 36.9 °C (98.4 °F) 37 °C (98.6 °F)   TempSrc: Oral Oral Oral Oral   SpO2: 99% 97% 97% 94%   Weight:       Height:                             Physical Exam  Vitals and nursing note reviewed.   Constitutional:       Appearance: He is well-developed.   HENT:      Head: Normocephalic.   Eyes:      Extraocular Movements: Extraocular movements intact.   Abdominal:      General: Abdomen is flat.      Palpations: Abdomen is soft.      Tenderness: There is abdominal tenderness in the right lower quadrant.   Skin:     General: Skin is warm.   Neurological:      Mental Status: He is alert and oriented to person, place, and time.   Psychiatric:         Mood and Affect: Mood normal.              Labs Reviewed   CBC AND DIFF - Abnormal       Result Value    WBC 14.28 (*)     RBC 4.90      Hemoglobin 15.6      Hematocrit 44.3      MCV 90.4      MCH 31.8      MCHC 35.2      RDW 13.2      Platelets 194      MPV 11.9      Differential Type Auto      nRBC 0.0      Immature Granulocytes 0.6      Neutrophils 86.1      Lymphocytes 4.8      Monocytes 8.3      Eosinophils 0.1      Basophils 0.1      Immature Granulocytes, Absolute 0.09 (*)     Neutrophils, Absolute 12.30 (*)     Lymphocytes, Absolute 0.68 (*)     Monocytes, Absolute 1.18 (*)     Eosinophils, Absolute 0.01 (*)     Basophils, Absolute 0.02     COMPREHENSIVE METABOLIC PANEL - Abnormal    Sodium 134 (*)     Potassium 3.7      Chloride 101      CO2 24      BUN 20      Creatinine 0.8      Glucose 146 (*)     Calcium 9.5      AST (SGOT) 16      ALT (SGPT) 22      Alkaline Phosphatase 45      Total Protein 7.6      Albumin 4.4      Bilirubin, Total 1.0      eGFR >60.0      Anion Gap 9     UA REFLEX CULTURE (MACROSCOPIC) - Abnormal    Color, Urine Yellow      Clarity, Urine Clear      Specific Gravity, Urine >1.035 (*)     pH, Urine 7.0      Leukocyte  Esterase Negative      Nitrite, Urine Negative      Protein, Urine +2 (*)     Glucose, Urine Negative      Ketones, Urine Negative      Urobilinogen, Urine 0.2      Bilirubin, Urine Negative      Blood, Urine Negative     UA MICROSCOPIC (UCU) - Abnormal    RBC, Urine 0 TO 4      WBC, Urine 0 TO 3      Squamous Epithelial Rare (*)     Hyaline Cast None Seen      Bacteria, Urine None Seen      MUCUSUA +1 (*)    BASIC METABOLIC PANEL - Abnormal    Sodium 137      Potassium 4.3      Chloride 108      CO2 24      BUN 19      Creatinine 0.7 (*)     Glucose 99      Calcium 8.1 (*)     eGFR >60.0      Anion Gap 5     CBC - Abnormal    WBC 10.46      RBC 3.93 (*)     Hemoglobin 12.5 (*)     Hematocrit 38.8 (*)     MCV 98.7 (*)     MCH 31.8      MCHC 32.2      RDW 13.5      Platelets 147 (*)     MPV 11.9     SARS-COV-2 (COVID 19), PCR (PERF) - Normal    SARS-CoV-2 (COVID-19) Negative     HIGH SENSITIVE TROPONIN I (BASELINE - REFLEX 2HR) - Normal    High Sens Troponin I 3.1     LIPASE - Normal    Lipase 31     ETHANOL - Normal    Ethanol <5     PHOSPHORUS - Normal    Phosphorus 2.7     SARS-COV-2 (COVID 19), PCR    Narrative:     The following orders were created for panel order SARS-CoV-2 (COVID-19), PCR Nasopharynx.  Procedure                               Abnormality         Status                     ---------                               -----------         ------                     SARS-CoV-2 (COVID-19), P...[[id number]]  Normal              Final result                 Please view results for these tests on the individual orders.   URINALYSIS REFLEX CULTURE (ED AND OUTPATIENT ONLY)    Narrative:     The following orders were created for panel order UA with reflex culture.  Procedure                               Abnormality         Status                     ---------                               -----------         ------                     UA Reflex to Culture (Ma...[[id number]]  Abnormal            Final result                UA Microscopic[[id number]]               Abnormal            Final result                 Please view results for these tests on the individual orders.   RAINBOW DRAW PANEL    Narrative:     The following orders were created for panel order [place] Draw Panel.  Procedure                               Abnormality         Status                     ---------                               -----------         ------                     RAINBOW RED[[id number]]                                      Final result               RAINBOW LT BLUE[[id number]]                                  Final result               RAINBOW GOLD[[id number]]                                     Final result                 Please view results for these tests on the individual orders.   PATHOLOGY TISSUE EXAM   RAINBOW RED   RAINBOW LT BLUE   RAINBOW GOLD       CT ABDOMEN PELVIS WITH IV CONTRAST   Final Result   IMPRESSION: Acute appendicitis.                  ECG 12 lead   ED Interpretation   Rhythm: Normal Sinus  Rate: 96  P waves: [normal interval]  QRS: [normal QRS]  Axis: [normal]  ST Segments: Nonspecific    Rhythm Strip: Normal sinus rhythm with a rate of 96    O2 sat: [normal 94%        Final Result            Procedures    Final diagnoses:   [K35.80] Acute appendicitis, unspecified acute appendicitis type       ED Course & MDM     ED Course as of 06/19/22 1513   Fri Jun 17, 2022   1159 Was found to have an acute appendicitis call has been placed to general surgery [LB]   1227 56-year-old male currently on day 6 of alcohol rehab at [facility].  Reports to emergency room today related to abdominal pain.  Pain started yesterday associated with 1 episode of nausea vomiting denies diarrhea.  Normal bowel movements.  Patient has an elevated white count of 14.28 he has acute appendicitis on CAT scan I spoken to Dr. Mckeon general surgery excepting the patient to their service.  Plan will be for surgical intervention later today.  Patient adamantly  denies any alcohol usage in the past 6 days we will give him Rocephin and Flagyl x1 patient care has now been transferred to surgical team [LB]      ED Course User Index  [LB] Jessica Sorensen CRNP         Clinical Impressions as of 06/19/22 1513   Acute appendicitis, unspecified acute appendicitis type           MDM    3:13 PM    Impression: abd pain     Plan: labs ct ua    Jessica STOKES. BENNETT Sorensen  6/19/2022           Jessica Sorensen CRNP  06/19/22 1513

## 2022-06-17 NOTE — ANESTHESIA PROCEDURE NOTES
Airway  Urgency: elective    Start Time: 6/17/2022 5:43 PM  Airway not difficult    General Information and Staff    Patient location during procedure: OR  Anesthesiologist: Sae Mallory MD  Resident/CRNA: Stephanie Richey CRNA  Performed: resident/CRNA     Indications and Patient Condition  Indications for airway management: anesthesia  Sedation level: deep  Preoxygenated: yes  Patient position: sniffing  MILS not maintained throughout  Mask difficulty assessment: 0 - not attempted    Final Airway Details  Final airway type: endotracheal airway      Successful airway: ETT  Cuffed: yes   Successful intubation technique: direct laryngoscopy  Facilitating devices/methods: intubating stylet  Endotracheal tube insertion site: oral  Blade: Lon  Blade size: #4  ETT size (mm): 7.5  Cormack-Lehane Classification: grade I - full view of glottis  Placement verified by: chest auscultation and capnometry   Measured from: teeth  ETT to teeth (cm): 24  Number of attempts at approach: 1  Number of other approaches attempted: 0  Atraumatic airway insertion

## 2022-06-17 NOTE — ANESTHESIOLOGIST PRE-PROCEDURE ATTESTATION
Pre-Procedure Patient Identification:  I am the Primary Anesthesiologist and have identified the patient on 06/17/22 at 5:07 PM.   I have confirmed the procedure(s) will be performed by the following surgeon/proceduralist Daljit Marlow MD.

## 2022-06-18 VITALS
WEIGHT: 243.3 LBS | SYSTOLIC BLOOD PRESSURE: 130 MMHG | BODY MASS INDEX: 34.06 KG/M2 | TEMPERATURE: 98.6 F | RESPIRATION RATE: 17 BRPM | HEIGHT: 71 IN | DIASTOLIC BLOOD PRESSURE: 71 MMHG | HEART RATE: 78 BPM | OXYGEN SATURATION: 94 %

## 2022-06-18 LAB
ANION GAP SERPL CALC-SCNC: 5 MEQ/L (ref 3–15)
ATRIAL RATE: 96
BUN SERPL-MCNC: 19 MG/DL (ref 8–20)
CALCIUM SERPL-MCNC: 8.1 MG/DL (ref 8.9–10.3)
CHLORIDE SERPL-SCNC: 108 MEQ/L (ref 98–109)
CO2 SERPL-SCNC: 24 MEQ/L (ref 22–32)
CREAT SERPL-MCNC: 0.7 MG/DL (ref 0.8–1.3)
ERYTHROCYTE [DISTWIDTH] IN BLOOD BY AUTOMATED COUNT: 13.5 % (ref 11.6–14.4)
GFR SERPL CREATININE-BSD FRML MDRD: >60 ML/MIN/1.73M*2
GLUCOSE SERPL-MCNC: 99 MG/DL (ref 70–99)
HCT VFR BLDCO AUTO: 38.8 % (ref 40.1–51)
HGB BLD-MCNC: 12.5 G/DL (ref 13.7–17.5)
MCH RBC QN AUTO: 31.8 PG (ref 28–33.2)
MCHC RBC AUTO-ENTMCNC: 32.2 G/DL (ref 32.2–36.5)
MCV RBC AUTO: 98.7 FL (ref 83–98)
P AXIS: 24
PDW BLD AUTO: 11.9 FL (ref 9.4–12.4)
PHOSPHATE SERPL-MCNC: 2.7 MG/DL (ref 2.4–4.7)
PLATELET # BLD AUTO: 147 K/UL (ref 150–350)
POTASSIUM SERPL-SCNC: 4.3 MEQ/L (ref 3.6–5.1)
PR INTERVAL: 146
QRS DURATION: 62
QT INTERVAL: 324
QTC CALCULATION(BAZETT): 409
R AXIS: 31
RBC # BLD AUTO: 3.93 M/UL (ref 4.5–5.8)
SODIUM SERPL-SCNC: 137 MEQ/L (ref 136–144)
T WAVE AXIS: 27
VENTRICULAR RATE: 96
WBC # BLD AUTO: 10.46 K/UL (ref 3.8–10.5)

## 2022-06-18 PROCEDURE — 96375 TX/PRO/DX INJ NEW DRUG ADDON: CPT

## 2022-06-18 PROCEDURE — 63600000 HC DRUGS/DETAIL CODE: Performed by: SURGERY

## 2022-06-18 PROCEDURE — 96366 THER/PROPH/DIAG IV INF ADDON: CPT

## 2022-06-18 PROCEDURE — 25000000 HC PHARMACY GENERAL: Performed by: SURGERY

## 2022-06-18 PROCEDURE — 80048 BASIC METABOLIC PNL TOTAL CA: CPT | Performed by: SURGERY

## 2022-06-18 PROCEDURE — 63700000 HC SELF-ADMINISTRABLE DRUG: Performed by: SURGERY

## 2022-06-18 PROCEDURE — G0378 HOSPITAL OBSERVATION PER HR: HCPCS

## 2022-06-18 PROCEDURE — 84100 ASSAY OF PHOSPHORUS: CPT | Performed by: SURGERY

## 2022-06-18 PROCEDURE — 36415 COLL VENOUS BLD VENIPUNCTURE: CPT | Performed by: SURGERY

## 2022-06-18 PROCEDURE — 25800000 HC PHARMACY IV SOLUTIONS: Performed by: SURGERY

## 2022-06-18 PROCEDURE — 85027 COMPLETE CBC AUTOMATED: CPT | Performed by: SURGERY

## 2022-06-18 RX ORDER — ACETAMINOPHEN 500 MG
1000 TABLET ORAL EVERY 6 HOURS PRN
Qty: 30 TABLET | Refills: 0 | Status: SHIPPED | OUTPATIENT
Start: 2022-06-18 | End: 2022-07-18

## 2022-06-18 RX ORDER — CEFUROXIME AXETIL 500 MG/1
500 TABLET ORAL 2 TIMES DAILY
Qty: 14 TABLET | Refills: 0 | Status: SHIPPED | OUTPATIENT
Start: 2022-06-18 | End: 2022-06-25

## 2022-06-18 RX ORDER — IBUPROFEN 800 MG/1
800 TABLET ORAL EVERY 8 HOURS PRN
Qty: 30 TABLET | Refills: 0 | Status: SHIPPED | OUTPATIENT
Start: 2022-06-18 | End: 2022-06-28

## 2022-06-18 RX ORDER — METRONIDAZOLE 500 MG/1
500 TABLET ORAL 3 TIMES DAILY
Qty: 21 TABLET | Refills: 0 | Status: SHIPPED | OUTPATIENT
Start: 2022-06-18 | End: 2022-06-25

## 2022-06-18 RX ADMIN — KETOROLAC TROMETHAMINE 15 MG: 15 INJECTION, SOLUTION INTRAMUSCULAR; INTRAVENOUS at 05:12

## 2022-06-18 RX ADMIN — LISINOPRIL 5 MG: 5 TABLET ORAL at 08:31

## 2022-06-18 RX ADMIN — SERTRALINE HYDROCHLORIDE 75 MG: 50 TABLET ORAL at 08:31

## 2022-06-18 RX ADMIN — THIAMINE HCL TAB 100 MG 100 MG: 100 TAB at 08:31

## 2022-06-18 RX ADMIN — METRONIDAZOLE 500 MG: 500 INJECTION, SOLUTION INTRAVENOUS at 05:12

## 2022-06-18 RX ADMIN — LEVETIRACETAM 500 MG: 500 TABLET, FILM COATED ORAL at 08:31

## 2022-06-18 RX ADMIN — FOLIC ACID 1 MG: 1 TABLET ORAL at 08:32

## 2022-06-18 RX ADMIN — SODIUM CHLORIDE 1 G: 900 INJECTION INTRAVENOUS at 12:21

## 2022-06-18 NOTE — NURSING NOTE
IV removed from pt. Pt dressed, belongings gathered. D/c paperwork discussed with understanding from pt. Pt d/c'd via wheelchair to [facility].

## 2022-06-18 NOTE — BEHAVIORAL HEALTH CRISIS PROGRESS NOTE
Behavioral Health Crisis Clinician Progress Note      Reason for Follow Up   Patient returning to [facility].    Admitting Diagnoses  Acute appendicitis [K35.80]  Acute appendicitis, unspecified acute appendicitis type [K35.80]     Interval History  Received message from attending who informs patient is medically clear and stable to return to [facility] today.      10:45am [facility] call to [facility] and spoke with admissions informed patient had appendectomy and is now medically stable to return.  [facility] confirms they are able to review clinical to consider for patient to return today. [facility] faxed clinicals to [facility] for review.     11:12 am Phone call from [place] at [facility] who informed patient can return to [facility].    11:26 am Phone call from Mi Vaughan RN who informed she will coordinate patient transport to [facility].      Assessment/Plan  [facility] will continue to facilitate d/c to [facility].

## 2022-06-18 NOTE — DISCHARGE INSTRUCTIONS
We will plan a being discharged back to your recovery facility.  There you will have oral medications for pain, nonnarcotics.  This will be a combination of acetaminophen as well as ibuprofen.  If these medicines are taken as prescribed, and together, they will be very effective for pain control.  You may eat a regular diet as tolerated.  You may have some bloating and abdominal cramping for the next few days, this will resolve.  You have 2 antibiotics to take for a total of 1 week.  The metronidazole may cause some stomach discomfort, you may choose to take some food with this.  If you have new sustained fevers, severe worsening abdominal pain, protracted nausea and vomiting, you will need to return to the ER.  However otherwise, I would like to see you in my office in 2 to 3 weeks for follow-up.  As far as showering goes, you may remove the bandages tomorrow, leave the white strips on the skin underneath the bandage in place for 7 more days, but you may shower tomorrow.  Call my office number below at any time for questions or concerns, and to schedule your follow-up appointment.    Daljit Marlow MD  [facility]  [phone]

## 2022-06-18 NOTE — BEHAVIORAL HEALTH CRISIS PROGRESS NOTE
Patient Information    Patient Name   Omer Steele    Address   [address] [address]    Race   White                    Patient Legal Name   Omer Steele    Legal Sex   Male    Date of Birth   1965                    Room   4226    Ethnic Group   Not , /a, or [place] origin    Language   English                    MRN   [id number]    Phone Numbers   Hm: [phone] Cell: [phone]    PCP   Gonzalez Martinez MD                        Patient Contacts    Name Relation Home Work Mobile   RCA,Nursing Unknown  [phone]    Milady Steele Mother [phone]   Kashif Steele Father   [phone]       Documents Filed to Patient    Power of  Living Will Clinical Unknown Study Attachment Consent Form ABN Waiver After Visit Summary Lab Result Scan Code Status [facility] MyChart Status Advance Care Planning    Not on File  Not on File  Not on File  Not on File  Not on File  Not on File  Not on File  Not on File  FULL [Updated on 06/17/22 1852]  Pending Jump to the Activity        Auth/Cert Information    Create auth/cert for hospital account [id number]        Bed Days    No auth/cert for hospital account [id number]; no bed days information is available.       Admission Information      Current Information    Attending Provider Admitting Provider Admission Type Admission Status   Pinky Mckeon MD Sees, Susan J, MD Urgent Confirmed Admission          Admission Date/Time Discharge Date Hospital Service Auth/Cert Status   06/17/22  11:57 AM  Surgery Incomplete          Hospital Area Unit Room/Bed    [facility] 4B 4226/4226                Hospital Account    Name Acct ID Class Status Primary Coverage   Omer Steele [id number] [facility]            Guarantor Account (for Hospital Account #[id number])    Name Relation to Pt Service Area Active? Acct Type   Omer Steele Self [facility] Yes Personal/Family   [address] Phone      [address] [address] [phone](H)              Coverage Information (for Hospital Account #[id number])    F/O Payor/Plan Precert #   [employer] H[employer] EXCHANGE    Subscriber Subscriber #   Omer Steele [id number]   Address Phone   [address] [address]   [place] [address] [phone]     Daljit Marlow MD    Physician   General Surgery   Progress Notes       Signed   Date of Service:  6/18/2022 10:35 AM                 Signed        Expand All Collapse All          []Hide copied text    []Hover for details    General Surgery Daily Progress Note        Subjective       Much improved abdominal pain, no nausea or vomiting        Interval History:    Laparoscopic appendectomy yesterday           Objective         Vital signs in last 24 hours:  Temp:  [36.9 °C (98.4 °F)-37.3 °C (99.2 °F)] 36.9 °C (98.4 °F)  Heart Rate:  [] 75  Resp:  [14-20] 16  BP: ()/(51-81) 118/70        Intake/Output Summary (Last 24 hours) at 6/18/2022 1035  Last data filed at 6/18/2022 0511      Gross per 24 hour   Intake 3000 ml   Output 555 ml   Net 2445 ml      Intake/Output this shift:  No intake/output data recorded.     Physical Exam     General appearance: alert, appears stated age and cooperative  Head: normocephalic, without obvious abnormality, atraumatic  Lungs: Symmetric chest rise  Heart: The rate  Abdomen: Soft, nondistended, tender in right lower quadrant much improved, tender incisions, nonperitoneal     Labs         CBC Results        06/18/22 06/17/22       0506 1015     WBC 10.46 14.28     RBC 3.93 4.90     HGB 12.5 15.6     HCT 38.8 44.3     MCV 98.7 90.4     MCH 31.8 31.8     MCHC 32.2 35.2      194           Comment for HGB at 0506 on 06/18/22: ALL RESULTS HAVE BEEN CHECKED                 BMP Results        06/18/22 06/17/22       0506 1015      134     K 4.3 3.7     Cl 108 101     CO2 24 24     Glucose 99 146     BUN 19 20     Creatinine 0.7 0.8     Calcium  8.1 9.5     Anion Gap 5 9     EGFR >60.0 >60.0           Comment for K at 1015 on 06/17/22: Results obtained on plasma. Plasma Potassium values may be up to 0.4 mEQ/L less than serum values. The differences may be greater for patients with high platelet or white cell counts.             Imaging  CT abdomen pelvis reviewed.              Assessment/Plan         Acute appendicitis, postop day 1 laparoscopic appendectomy.  Patient had local purulence which was irrigated and suctioned.  Leukocytosis has improved, tenderness much improved, patient is tolerating a regular diet.  He will be appropriate for oral antibiotics and discharged today.  The patient is a recovering alcoholic, he came to our facility from a rehab center, [facility].  Will discussed with social work for discharge back to this center.     Possible discharge today  P.o. antibiotics on discharge  Over-the-counter Tylenol, ibuprofen for pain management at discharge        MD Mike Walter Susan J, MD    Physician   General Surgery   H&P       Signed   Date of Service:  6/17/2022 12:08 PM                 Signed        Expand All Collapse All          []Hide copied text    []Joshua for details    General Surgery Admission H & P     Subjective      Omer Steele is a 56 y.o. male with a past medical history including alcoholism, currently in rehab, sz disorder, anxiety/depression, HTN, who presented to the ED today with complaint of lower abdominal pain associated with vomiting which started with mild symptoms last night, but this morning, escalated to severe lower abdominal pain. In the ED, WBC was elevated at 14, and CT scan showed acute appendicitis.      Medical History:   Medical History        Past Medical History:   Diagnosis Date   • Alcoholism (CMS/HCC)     • Anxiety     • Depression     • High blood pressure              Surgical History:   Surgical History         Past Surgical History:   Procedure Laterality Date   •  "CATARACT EXTRACTION Left              Social History:   Social History          Social History Narrative   • Not on file         Family History: History reviewed. No pertinent family history.     Allergies: Patient has no known allergies.     Home Medications:  Not in a hospital admission.     Current Medications:  •  cefTRIAXone, 1 g, intravenous, Once  •  metroNIDAZOLE, 500 mg, intravenous, Once  •  sodium chloride, 1,000 mL, intravenous, Once  •  folic acid  •  levETIRAcetam  •  loratadine  •  melatonin  •  metoprolol succinate XL  •  multivitamin  •  oxazepam  •  thiamine  •  lisinopriL  •  sertraline     Review of Systems  Pertinent items are noted in HPI.     Objective      Physicial Exam  Visit Vitals  /83 (BP Location: Left upper arm, Patient Position: Lying)   Pulse 100   Temp 36.7 °C (98 °F) (Temporal)   Resp 18   Ht 1.803 m (5' 11\")   Wt 107 kg (235 lb)   SpO2 94%   BMI 32.78 kg/m²         General appearance: alert, appears stated age and cooperative  Head: normocephalic, without obvious abnormality, atraumatic  Eyes: conjunctivae/corneas clear. PERRL, EOM's grossly intact. Sclerae nonicteric.  Neck: trachea midline   Lungs: clear to auscultation bilaterally  Heart: regular rate and rhythm, no murmur  Abdomen: Soft, nondistended. TTP RLQ with voluntary guarding.   Rectal: deferred  Extremities: extremities normal, warm and well-perfused; no cyanosis, clubbing, or edema  Skin: Skin color, texture, turgor normal. No rashes or lesions  Neurologic: Grossly normal     Labs        CBC Results        06/17/22       1015     WBC 14.28     RBC 4.90     HGB 15.6     HCT 44.3     MCV 90.4     MCH 31.8     MCHC 35.2                     CMP Results        06/17/22       1015          K 3.7     Cl 101     CO2 24     Glucose 146     BUN 20     Creatinine 0.8     Calcium 9.5     Anion Gap 9     AST 16     ALT 22     Albumin 4.4     EGFR >60.0           Comment for K at 1015 on 06/17/22: Results " obtained on plasma. Plasma Potassium values may be up to 0.4 mEQ/L less than serum values. The differences may be greater for patients with high platelet or white cell counts.                Imaging  CT a/p:   Lung bases: Mild areas of scarring and/or partial atelectasis at the lung bases  bilaterally.     Lower chest soft tissue: Atherosclerotic calcifications of the coronary  arteries. Trace pericardial effusion.     Liver: Unremarkable.     Spleen: Unremarkable.     Adrenals: Unremarkable.     Pancreas: Unremarkable.     Kidneys: Bilateral perinephric fat stranding. No hydronephrosis.     Gallbladder:  Gallstones in the gallbladder. No pericholecystic inflammatory  changes.     Retroperitoneal structures: Atherosclerotic calcifications of the abdominal  aorta and iliac arteries without evidence of aneurysm.     Bowel and mesentery: The appendix is dilated and fluid-filled with inflammatory  stranding in the adjacent peritoneal fat. The appendix measures up to 1.2 cm  transversely. There is mural thickening and mural edema involving the cecal tip  at the origin of the appendix. No dilated loops of large or small bowel to  suggest obstruction. No free intraperitoneal air. No bowel wall pneumatosis.  Fat-containing umbilical hernia.     Ascites: Mild amount of free fluid in the pelvis.     Lymph nodes: None enlarged.     Reproductive Organs: Unremarkable.     Bladder: Unremarkable.     Bones: No suspicious lytic or blastic lesions.     --  IMPRESSION: Acute appendicitis.                                        Assessment and Plan  Acute appendicitis.   -- The patient will remain NPO, receive IV abx, and is scheduled for laparoscopic appendectomy, pending OR availability.      Pinky Mckeon MD                     SARS-CoV-2 (COVID-19), PCR Nasopharynx  Order: [id number] - Part of Panel Order [id number]   Collected 6/17/2022 12:22     Status: Final result     Visible to patient: No (not released)    Specimen  Information: Nasopharynx; Nasopharyngeal Swab         0 Result Notes    Component Ref Range & Units    SARS-CoV-2 (COVID-19) Negative Negative    Resulting Agency  [facility]              Specimen Collected: 06/17/22 12:22 Last Resulted: 06/17/22 13:13

## 2022-06-18 NOTE — PLAN OF CARE
Plan of Care Review  Plan of Care Reviewed With: patient  Progress: improving  Outcome Summary: VSS. Pt reports mild abd pain, controlled with prn oxycodone and ice. Assist x1 to BR. Voiding without difficulty. Abd dressings in tact. Denies nausea. All needs met. WCTM.

## 2022-06-18 NOTE — PROGRESS NOTES
General Surgery Daily Progress Note    Subjective    Much improved abdominal pain, no nausea or vomiting      Interval History:    Laparoscopic appendectomy yesterday    Objective     Vital signs in last 24 hours:  Temp:  [36.9 °C (98.4 °F)-37.3 °C (99.2 °F)] 36.9 °C (98.4 °F)  Heart Rate:  [] 75  Resp:  [14-20] 16  BP: ()/(51-81) 118/70      Intake/Output Summary (Last 24 hours) at 6/18/2022 1035  Last data filed at 6/18/2022 0511  Gross per 24 hour   Intake 3000 ml   Output 555 ml   Net 2445 ml     Intake/Output this shift:  No intake/output data recorded.    Physical Exam    General appearance: alert, appears stated age and cooperative  Head: normocephalic, without obvious abnormality, atraumatic  Lungs: Symmetric chest rise  Heart: The rate  Abdomen: Soft, nondistended, tender in right lower quadrant much improved, tender incisions, nonperitoneal    Labs  CBC Results       06/18/22 06/17/22     0506 1015    WBC 10.46 14.28    RBC 3.93 4.90    HGB 12.5 15.6    HCT 38.8 44.3    MCV 98.7 90.4    MCH 31.8 31.8    MCHC 32.2 35.2     194         Comment for HGB at 0506 on 06/18/22: ALL RESULTS HAVE BEEN CHECKED        BMP Results       06/18/22 06/17/22     0506 1015     134    K 4.3 3.7    Cl 108 101    CO2 24 24    Glucose 99 146    BUN 19 20    Creatinine 0.7 0.8    Calcium 8.1 9.5    Anion Gap 5 9    EGFR >60.0 >60.0         Comment for K at 1015 on 06/17/22: Results obtained on plasma. Plasma Potassium values may be up to 0.4 mEQ/L less than serum values. The differences may be greater for patients with high platelet or white cell counts.          Imaging  CT abdomen pelvis reviewed.      Assessment/Plan     Acute appendicitis, postop day 1 laparoscopic appendectomy.  Patient had local purulence which was irrigated and suctioned.  Leukocytosis has improved, tenderness much improved, patient is tolerating a regular diet.  He will be appropriate for oral antibiotics and discharged today.   The patient is a recovering alcoholic, he came to our facility from a rehab center, [facility].  Will discussed with social work for discharge back to this center.    Possible discharge today  P.o. antibiotics on discharge  Over-the-counter Tylenol, ibuprofen for pain management at discharge      Daljit Marlow MD

## 2022-06-19 ASSESSMENT — ENCOUNTER SYMPTOMS
SHORTNESS OF BREATH: 0
CONFUSION: 0
ABDOMINAL PAIN: 1
HEADACHES: 0
CHEST TIGHTNESS: 0
FEVER: 0

## 2022-06-21 LAB
CASE RPRT: NORMAL
CLINICAL INFO: NORMAL
PATH REPORT.FINAL DX SPEC: NORMAL
PATH REPORT.GROSS SPEC: NORMAL

## 2022-06-22 NOTE — OP NOTE
Date: 6/17/2022    Surgeon: Daljit Marlow MD    Procedure: Laparoscopic appendectomy    Preoperative diagnosis: Acute appendicitis    Postoperative diagnosis: Same as above    Indication: Acute appendicitis    Anesthesia: General with endotracheal tube    Estimated blood loss: Normal    Complications: None    Specimen: Appendix and mesoappendix    Findings: Cute appendicitis, periappendiceal local purulent fluid    Details of procedure: After obtaining informed consent, the patient was brought into the operating room and placed in a supine position.  After the induction of general anesthesia, the left arm was tucked and the abdomen was then prepped and draped in standard surgical fashion.  Final timeout was performed and the correct patient and procedure were confirmed.     Curvilinear incision was made above the umbilicus, we dissected down to the fascia.  The fascia was incised sharply, the underlying peritoneum was grasped and bluntly entered, a 12 mm Honeycutt port entered, abdomen insufflated to 15 mmHg, 5 mm 0 degree scope advanced, no injury to the underlying viscera apparent.  The patient was placed in a Trendelenburg position.  A 5 mm trocar was placed in the left lower quadrant lateral to the epigastric vessels under direct visualization after anesthetizing the skin, muscle, fascia, and peritoneum.  A 5 mm suprapubic trocar was placed under direct visualization after anesthetizing skin, muscle, fascia, and peritoneum.  The patient was placed in the left side down position.  The appendix visualized; it was injected and inflamed, with some local purulence, this was suctioned clear, there is no evidence of overt rupture from the appendix.  Some reactive fluid was suctioned from the pelvis. .  It was grasped and retracted anteriorly and inferiorly.  A window was created between the base of the appendix and the mesoappendix with the [place] dissector.  An Endo LACIE blue load was fired across the base of the  appendix.  A white vascular load fired to transect the mesoappendix.  The appendix was placed in an Endobag and removed from the left lower quadrant trocar site.  The dissection bed and staple lines were examined again; they were hemostatic. Insufflation was turned off, and the patient was returned to neutral position; the CO2 was suctioned from the abdomen with the help of a Valsalva maneuver.  The fascia was closed at the umbilical trocar site with 0 Vicryl suture.  All trochars were removed.  The left lower quadrant incision was irrigated and dried.  The skin edges were inspected for bleeding all bleeding was controlled with electrocautery.  The skin was then reapproximated with 4-0 Monocryl in a running subcuticular or interrupted fashion.  The wounds were washed, dried, and Steri-Strips were applied.  The patient tolerated the procedure well, was extubated, and was transferred to the PACU for recovery in stable condition.    Daljit Marlow MD

## 2023-12-06 ENCOUNTER — PROBLEM (OUTPATIENT)
Dept: URBAN - METROPOLITAN AREA CLINIC 79 | Facility: CLINIC | Age: 58
End: 2023-12-06

## 2023-12-06 DIAGNOSIS — H35.3132: ICD-10-CM

## 2023-12-06 DIAGNOSIS — H40.023: ICD-10-CM

## 2023-12-06 DIAGNOSIS — H33.012: ICD-10-CM

## 2023-12-06 PROCEDURE — 92133 CPTRZD OPH DX IMG PST SGM ON: CPT | Mod: NC

## 2023-12-06 PROCEDURE — 92250 FUNDUS PHOTOGRAPHY W/I&R: CPT

## 2023-12-06 PROCEDURE — 99214 OFFICE O/P EST MOD 30 MIN: CPT

## 2023-12-06 PROCEDURE — 92134 CPTRZ OPH DX IMG PST SGM RTA: CPT | Mod: NC

## 2023-12-06 ASSESSMENT — TONOMETRY
OS_IOP_MMHG: 23
OS_IOP_MMHG: 25
OD_IOP_MMHG: 33
OD_IOP_MMHG: 32

## 2023-12-06 ASSESSMENT — VISUAL ACUITY
OD_SC: 20/20
OS_SC: 20/400

## (undated) DEVICE — GRASPER BABCOCK ENDO 5MM

## (undated) DEVICE — NEEDLE INSUFFLATION 120MM

## (undated) DEVICE — TROCAR 1ST ENTRY 12 X 100MM ADV Z-THREAD

## (undated) DEVICE — SUTURE MONOCRYL 4-0  Y496G PS-2 I8IN

## (undated) DEVICE — ***USE 57000*** SUTURE PROLENE  0   8412H

## (undated) DEVICE — ***USE 138152*** ENDOPOUCH RETRIEVE 10MM

## (undated) DEVICE — TIPS SCISSOR MICROLINE LAP

## (undated) DEVICE — SOLN IRRIG STER WATER 1000ML

## (undated) DEVICE — ***USE 57698*** SLEEVE FLOWTRON DVT CALF SINGLE USE

## (undated) DEVICE — GLOVE SZ 7.5 PROTEXIS PI

## (undated) DEVICE — SYRINGE DISP LUER-LOK 10 CC

## (undated) DEVICE — Device

## (undated) DEVICE — ***USE 138711*** SUTURE VICRYL 0 J112T TIES 18IN

## (undated) DEVICE — TROCAR 1ST ENTRY 5 X 100MM ADV Z-THREAD

## (undated) DEVICE — SOLN IRRIG .9%SOD 1000ML

## (undated) DEVICE — APPLICATOR CHLORAPREP 26ML ORANGE TINT

## (undated) DEVICE — ***USE 70465*** ENDOCUTTER ATS45 NO RELOAD

## (undated) DEVICE — TUBING INSUFFLATION PNEUMOSURE HIGH FLOW

## (undated) DEVICE — VIAL DECANTER

## (undated) DEVICE — EVACUATOR PLUME-AWAY LAP SMOKE PKG

## (undated) DEVICE — ***USE 115912*** RELOAD LINEAR CUTTER 45MM VASCULAR

## (undated) DEVICE — PAD GROUND ELECTROSURGICAL W/CORD

## (undated) DEVICE — TROCAR SLEEVE 5MM

## (undated) DEVICE — MASTISOL LIQUID ADHESIVE

## (undated) DEVICE — MANIFOLD FOUR PORT NEPTUNE

## (undated) DEVICE — ***USE 115912*** RELOAD LINEAR CUTTER 45MM TR45B

## (undated) DEVICE — PACK RFID LAP CHOLE PMH

## (undated) DEVICE — SYRINGE DISP LUER-LOK  3 CC

## (undated) RX ORDER — BROMFENAC 0.76 MG/ML: 1 SOLUTION/ DROPS OPHTHALMIC ONCE A DAY

## (undated) RX ORDER — PREDNISOLONE ACETATE 10 MG/ML: 1 SUSPENSION/ DROPS OPHTHALMIC

## (undated) RX ORDER — POLYMYXIN B SULFATE AND TRIMETHOPRIM 1; 10000 MG/ML; [USP'U]/ML: 1 SOLUTION OPHTHALMIC

## (undated) RX ORDER — BRIMONIDINE TARTRATE, TIMOLOL MALEATE 2; 5 MG/ML; MG/ML: 1 SOLUTION/ DROPS OPHTHALMIC ONCE A DAY

## (undated) RX ORDER — BRIMONIDINE TARTRATE, TIMOLOL MALEATE 2; 5 MG/ML; MG/ML
1 SOLUTION/ DROPS OPHTHALMIC TWICE A DAY
Start: 2021-04-22